# Patient Record
Sex: FEMALE
[De-identification: names, ages, dates, MRNs, and addresses within clinical notes are randomized per-mention and may not be internally consistent; named-entity substitution may affect disease eponyms.]

---

## 2019-10-07 ENCOUNTER — APPOINTMENT (OUTPATIENT)
Dept: ENDOCRINOLOGY | Facility: CLINIC | Age: 49
End: 2019-10-07
Payer: COMMERCIAL

## 2019-10-07 VITALS
BODY MASS INDEX: 33.04 KG/M2 | SYSTOLIC BLOOD PRESSURE: 140 MMHG | WEIGHT: 175 LBS | HEART RATE: 82 BPM | HEIGHT: 61 IN | DIASTOLIC BLOOD PRESSURE: 80 MMHG

## 2019-10-07 DIAGNOSIS — Z78.9 OTHER SPECIFIED HEALTH STATUS: ICD-10-CM

## 2019-10-07 PROBLEM — Z00.00 ENCOUNTER FOR PREVENTIVE HEALTH EXAMINATION: Status: ACTIVE | Noted: 2019-10-07

## 2019-10-07 PROCEDURE — 99205 OFFICE O/P NEW HI 60 MIN: CPT

## 2019-10-07 NOTE — ASSESSMENT
[Long Term Vascular Complications] : long term vascular complications of diabetes [FreeTextEntry1] : 1) DM2: controlled,. target of <7%. Natural hx of the disease and importance of treatment targets discussed at length, she verbalized understanding. ADA diet and importance of exercise discussed at length. Plan is to increase metformin to full dose and introduce GLP-1 agonist in the future.. Refer to Nutritionist today. We toney check microalbumin, lipids and labs on the NV. Discuss vaccines and podiatry/opthalmology referrals on NV \par 2) Weight gain:  complicated by DM2. Discussed medical  strategies. Pt would like to try lifestyle modifications and GLP-1 agonist therapy in the future. Reassess on the NV for at least ~5% TBW loss.\par \par 3) Essential HTN: Pt is at goal BP and on an ACE inh. Reassess microalbumin prior to the NV.\par  \par 4) Dyslipidemia: Pt not on a moderate intensity statin. Atorvastatin 20 mg QDaily. REassess lipids on the next visit. LDL target <100.\par  [Carbohydrate Consistent Diet] : carbohydrate consistent diet [Importance of Diet and Exercise] : importance of diet and exercise to improve glycemic control, achieve weight loss and improve cardiovascular health

## 2019-10-07 NOTE — PHYSICAL EXAM
[Alert] : alert [No Acute Distress] : no acute distress [Well Nourished] : well nourished [Normal Sclera/Conjunctiva] : normal sclera/conjunctiva [Well Developed] : well developed [EOMI] : extra ocular movement intact [No Proptosis] : no proptosis [Normal Oropharynx] : the oropharynx was normal [Thyroid Not Enlarged] : the thyroid was not enlarged [No Thyroid Nodules] : there were no palpable thyroid nodules [Clear to Auscultation] : lungs were clear to auscultation bilaterally [No Accessory Muscle Use] : no accessory muscle use [No Respiratory Distress] : no respiratory distress [Normal Rate] : heart rate was normal  [Normal S1, S2] : normal S1 and S2 [Regular Rhythm] : with a regular rhythm [Normal Bowel Sounds] : normal bowel sounds [No Edema] : there was no peripheral edema [Pedal Pulses Normal] : the pedal pulses are present [Not Tender] : non-tender [Soft] : abdomen soft [Not Distended] : not distended [Post Cervical Nodes] : posterior cervical nodes [Normal] : normal and non tender [Axillary Nodes] : axillary nodes [Anterior Cervical Nodes] : anterior cervical nodes [Spine Straight] : spine straight [No Spinal Tenderness] : no spinal tenderness [No Stigmata of Cushings Syndrome] : no stigmata of cushings syndrome [Normal Gait] : normal gait [No Rash] : no rash [Normal Strength/Tone] : muscle strength and tone were normal [Normal Reflexes] : deep tendon reflexes were 2+ and symmetric [Acanthosis Nigricans] : no acanthosis nigricans [No Tremors] : no tremors [Oriented x3] : oriented to person, place, and time

## 2019-10-07 NOTE — HISTORY OF PRESENT ILLNESS
[FreeTextEntry1] : 50 y/o F /w. Hx of DM2, HTN, HLD and obesity.\par here for initial evaluation and management of metabolic issues\par generally feels well and endorses no acute complaints.\par reports new diagnosis of DM2, in 6/2019. Starrted on Metformin 500 mg BID which she tolerates well. is adhereing to lifestyle changes, and loosing weight.\par She otherwise denies any f/c, CP, SOB, palpitations, tremors, depressed mood, anxiety, palpitations, n/v, stool/urinary abn, skin/weight changes, heat/cold intolerance, HAs, breast/nipple changes, polyuria/polydipsia/nocturia or other complaints.\par \par

## 2020-02-03 ENCOUNTER — APPOINTMENT (OUTPATIENT)
Dept: ENDOCRINOLOGY | Facility: CLINIC | Age: 50
End: 2020-02-03

## 2020-02-03 VITALS
HEIGHT: 61 IN | DIASTOLIC BLOOD PRESSURE: 80 MMHG | WEIGHT: 170 LBS | HEART RATE: 66 BPM | SYSTOLIC BLOOD PRESSURE: 138 MMHG | BODY MASS INDEX: 32.1 KG/M2

## 2020-02-03 NOTE — ASSESSMENT
[FreeTextEntry1] : 1) DM2: controlled,. target of <7%. Natural hx of the disease and importance of treatment targets discussed at length, she verbalized understanding. ADA diet and importance of exercise discussed at length. Plan is to increase metformin to full dose and introduce GLP-1 agonist in the future.. Refer to Nutritionist today. We toney check microalbumin, lipids and labs on the NV. Discuss vaccines and podiatry/opthalmology referrals on NV \par 2) Weight gain:  complicated by DM2. Discussed medical  strategies. Pt would like to try lifestyle modifications and GLP-1 agonist therapy in the future. Reassess on the NV for at least ~5% TBW loss.\par \par 3) Essential HTN: Pt is at goal BP and on an ACE inh. Reassess microalbumin prior to the NV.\par  \par 4) Dyslipidemia: Pt not on a moderate intensity statin. Atorvastatin 20 mg QDaily. REassess lipids on the next visit. LDL target <100.\par  [Long Term Vascular Complications] : long term vascular complications of diabetes [Carbohydrate Consistent Diet] : carbohydrate consistent diet [Importance of Diet and Exercise] : importance of diet and exercise to improve glycemic control, achieve weight loss and improve cardiovascular health

## 2020-02-03 NOTE — PHYSICAL EXAM
[Alert] : alert [No Acute Distress] : no acute distress [Well Nourished] : well nourished [Well Developed] : well developed [Normal Sclera/Conjunctiva] : normal sclera/conjunctiva [EOMI] : extra ocular movement intact [No Proptosis] : no proptosis [Normal Oropharynx] : the oropharynx was normal [Thyroid Not Enlarged] : the thyroid was not enlarged [No Thyroid Nodules] : there were no palpable thyroid nodules [No Respiratory Distress] : no respiratory distress [Clear to Auscultation] : lungs were clear to auscultation bilaterally [No Accessory Muscle Use] : no accessory muscle use [Normal Rate] : heart rate was normal  [Normal S1, S2] : normal S1 and S2 [Regular Rhythm] : with a regular rhythm [Pedal Pulses Normal] : the pedal pulses are present [No Edema] : there was no peripheral edema [Normal Bowel Sounds] : normal bowel sounds [Not Tender] : non-tender [Soft] : abdomen soft [Not Distended] : not distended [Post Cervical Nodes] : posterior cervical nodes [Anterior Cervical Nodes] : anterior cervical nodes [Axillary Nodes] : axillary nodes [Normal] : normal and non tender [No Spinal Tenderness] : no spinal tenderness [Spine Straight] : spine straight [No Stigmata of Cushings Syndrome] : no stigmata of cushings syndrome [Normal Gait] : normal gait [Normal Strength/Tone] : muscle strength and tone were normal [No Rash] : no rash [Acanthosis Nigricans] : no acanthosis nigricans [Normal Reflexes] : deep tendon reflexes were 2+ and symmetric [No Tremors] : no tremors [Oriented x3] : oriented to person, place, and time

## 2020-08-10 ENCOUNTER — APPOINTMENT (OUTPATIENT)
Dept: ENDOCRINOLOGY | Facility: CLINIC | Age: 50
End: 2020-08-10
Payer: SELF-PAY

## 2020-08-10 VITALS
BODY MASS INDEX: 34.55 KG/M2 | DIASTOLIC BLOOD PRESSURE: 90 MMHG | WEIGHT: 183 LBS | HEART RATE: 83 BPM | SYSTOLIC BLOOD PRESSURE: 140 MMHG | HEIGHT: 61 IN

## 2020-08-10 PROCEDURE — 99215 OFFICE O/P EST HI 40 MIN: CPT

## 2020-08-10 NOTE — HISTORY OF PRESENT ILLNESS
[FreeTextEntry1] : 48 y/o F /w. Hx of DM2, HTN, HLD and obesity.\par here for initial evaluation and management of metabolic issues\par generally feels well and endorses no acute complaints.\par reports new diagnosis of DM2, in 6/2019. Starrted on Metformin 500 mg BID which she tolerates well. is adhereing to lifestyle changes, and loosing weight.\par \par \par 8/2020 Here for /fu, generally feels well and endorses no acute complaints. No interval events since LV. Today reports worsening weight gain and FSG control at home, post prandial log shows hyperglycemia ~180.\par She otherwise denies any f/c, CP, SOB, palpitations, tremors, depressed mood, anxiety, palpitations, n/v, stool/urinary abn, skin/weight changes, heat/cold intolerance, HAs, breast/nipple changes, polyuria/polydipsia/nocturia or other complaints.\par \par 
no

## 2020-08-10 NOTE — PHYSICAL EXAM
[Well Nourished] : well nourished [Alert] : alert [Well Developed] : well developed [No Acute Distress] : no acute distress [Normal Sclera/Conjunctiva] : normal sclera/conjunctiva [EOMI] : extra ocular movement intact [Normal Oropharynx] : the oropharynx was normal [No Proptosis] : no proptosis [Thyroid Not Enlarged] : the thyroid was not enlarged [No Thyroid Nodules] : no palpable thyroid nodules [No Respiratory Distress] : no respiratory distress [No Accessory Muscle Use] : no accessory muscle use [Normal S1, S2] : normal S1 and S2 [Clear to Auscultation] : lungs were clear to auscultation bilaterally [Normal Rate] : heart rate was normal [Regular Rhythm] : with a regular rhythm [No Edema] : no peripheral edema [Not Tender] : non-tender [Pedal Pulses Normal] : the pedal pulses are present [Normal Bowel Sounds] : normal bowel sounds [Not Distended] : not distended [Soft] : abdomen soft [Normal Anterior Cervical Nodes] : no anterior cervical lymphadenopathy [Normal Posterior Cervical Nodes] : no posterior cervical lymphadenopathy [No Spinal Tenderness] : no spinal tenderness [Spine Straight] : spine straight [No Stigmata of Cushings Syndrome] : no stigmata of Cushings Syndrome [Normal Gait] : normal gait [Normal Strength/Tone] : muscle strength and tone were normal [No Rash] : no rash [Acanthosis Nigricans] : no acanthosis nigricans [No Tremors] : no tremors [Normal Reflexes] : deep tendon reflexes were 2+ and symmetric [Oriented x3] : oriented to person, place, and time

## 2020-08-10 NOTE — ASSESSMENT
[FreeTextEntry1] : 1) DM2: uncontrolled per FSG log. target of <7%. Natural hx of the disease and importance of treatment targets discussed at length, she verbalized understanding. ADA diet and importance of exercise discussed at length. Plan is to increase metformin to full dose and introduce GLP-1 agonist in the future.. Refer to Nutritionist today. We toney check microalbumin, lipids and labs on the NV. Discuss vaccines and podiatry/opthalmology referrals on NV \par start SGLT 2 inh, r/b/a and s.e including polyuria, nolvia and UTI reviewed. Verbalized understanding and agrees with treatment plan, will contact MD and seek emergency medical care if condition changes.\par \par \par 2) Weight gain:  complicated by DM2. Discussed medical  strategies. Pt would like to try lifestyle modifications and GLP-1 agonist therapy in the future. Reassess on the NV for at least ~5% TBW loss.\par \par 3) Essential HTN: Pt is at goal BP and on an ACE inh. Reassess microalbumin prior to the NV.\par  \par 4) Dyslipidemia: Pt not on a moderate intensity statin. Atorvastatin 20 mg QDaily. REassess lipids on the next visit. LDL target <100.\par  [Carbohydrate Consistent Diet] : carbohydrate consistent diet [Long Term Vascular Complications] : long term vascular complications of diabetes [Importance of Diet and Exercise] : importance of diet and exercise to improve glycemic control, achieve weight loss and improve cardiovascular health

## 2020-10-19 ENCOUNTER — APPOINTMENT (OUTPATIENT)
Dept: ENDOCRINOLOGY | Facility: CLINIC | Age: 50
End: 2020-10-19
Payer: COMMERCIAL

## 2020-10-19 VITALS
BODY MASS INDEX: 34.17 KG/M2 | DIASTOLIC BLOOD PRESSURE: 100 MMHG | SYSTOLIC BLOOD PRESSURE: 160 MMHG | HEART RATE: 90 BPM | WEIGHT: 181 LBS | HEIGHT: 61 IN

## 2020-10-19 PROCEDURE — 99215 OFFICE O/P EST HI 40 MIN: CPT | Mod: 25

## 2020-10-19 PROCEDURE — 99072 ADDL SUPL MATRL&STAF TM PHE: CPT

## 2020-10-19 NOTE — HISTORY OF PRESENT ILLNESS
[FreeTextEntry1] : 50 y/o F /w. Hx of DM2, HTN, HLD and obesity.\par here for initial evaluation and management of metabolic issues\par generally feels well and endorses no acute complaints.\par reports new diagnosis of DM2, in 6/2019. Starrted on Metformin 500 mg BID which she tolerates well. is adhereing to lifestyle changes, and loosing weight.\par \par \par 10/2020 Here for /fu, generally feels well and endorses no acute complaints. No interval events since LV. Today reports improcing weight and FSG control at home, post prandial log shows hyperglycemia ~160.\par She otherwise denies any f/c, CP, SOB, palpitations, tremors, depressed mood, anxiety, palpitations, n/v, stool/urinary abn, skin/weight changes, heat/cold intolerance, HAs, breast/nipple changes, polyuria/polydipsia/nocturia or other complaints.\par \par

## 2020-10-19 NOTE — ASSESSMENT
[FreeTextEntry1] : 1) DM2: uncontrolled per FSG log. target of <7%. Natural hx of the disease and importance of treatment targets discussed at length, she verbalized understanding. ADA diet and importance of exercise discussed at length. Plan is to increase metformin to full dose and introduce GLP-1 agonist in the future.. Refer to Nutritionist today. We toney check microalbumin, lipids and labs on the NV. Discuss vaccines and podiatry/opthalmology referrals on NV \par start SGLT 2 inh, r/b/a and s.e including polyuria, nolvia and UTI reviewed. Verbalized understanding and agrees with treatment plan, will contact MD and seek emergency medical care if condition changes.\par \par \par 2) Weight gain:  complicated by DM2. Discussed medical  strategies. Pt would like to try lifestyle modifications and GLP-1 agonist therapy in the future. Reassess on the NV for at least ~5% TBW loss.\par \par 3) Essential HTN: Pt is at goal BP and on an ACE inh. Reassess microalbumin prior to the NV.\par  \par 4) Dyslipidemia: Pt not on a moderate intensity statin. Atorvastatin 20 mg QDaily. REassess lipids on the next visit. LDL target <100.\par  [Long Term Vascular Complications] : long term vascular complications of diabetes [Carbohydrate Consistent Diet] : carbohydrate consistent diet [Importance of Diet and Exercise] : importance of diet and exercise to improve glycemic control, achieve weight loss and improve cardiovascular health

## 2020-10-19 NOTE — PHYSICAL EXAM
[Alert] : alert [Well Nourished] : well nourished [No Acute Distress] : no acute distress [Normal Sclera/Conjunctiva] : normal sclera/conjunctiva [Well Developed] : well developed [EOMI] : extra ocular movement intact [No Proptosis] : no proptosis [Normal Oropharynx] : the oropharynx was normal [Thyroid Not Enlarged] : the thyroid was not enlarged [No Respiratory Distress] : no respiratory distress [No Thyroid Nodules] : no palpable thyroid nodules [No Accessory Muscle Use] : no accessory muscle use [Clear to Auscultation] : lungs were clear to auscultation bilaterally [Normal S1, S2] : normal S1 and S2 [Normal Rate] : heart rate was normal [No Edema] : no peripheral edema [Regular Rhythm] : with a regular rhythm [Pedal Pulses Normal] : the pedal pulses are present [Normal Bowel Sounds] : normal bowel sounds [Not Tender] : non-tender [Not Distended] : not distended [Soft] : abdomen soft [Normal Anterior Cervical Nodes] : no anterior cervical lymphadenopathy [No Stigmata of Cushings Syndrome] : no stigmata of Cushings Syndrome [Spine Straight] : spine straight [No Spinal Tenderness] : no spinal tenderness [Normal Gait] : normal gait [Normal Strength/Tone] : muscle strength and tone were normal [No Rash] : no rash [Acanthosis Nigricans] : no acanthosis nigricans [No Tremors] : no tremors [Normal Reflexes] : deep tendon reflexes were 2+ and symmetric [Oriented x3] : oriented to person, place, and time

## 2021-01-04 ENCOUNTER — APPOINTMENT (OUTPATIENT)
Dept: ENDOCRINOLOGY | Facility: CLINIC | Age: 51
End: 2021-01-04
Payer: COMMERCIAL

## 2021-01-04 VITALS
DIASTOLIC BLOOD PRESSURE: 90 MMHG | SYSTOLIC BLOOD PRESSURE: 150 MMHG | WEIGHT: 180 LBS | HEIGHT: 61 IN | BODY MASS INDEX: 33.99 KG/M2 | HEART RATE: 91 BPM

## 2021-01-04 PROCEDURE — 99072 ADDL SUPL MATRL&STAF TM PHE: CPT

## 2021-01-04 PROCEDURE — 99214 OFFICE O/P EST MOD 30 MIN: CPT

## 2021-01-04 RX ORDER — NAPROXEN 500 MG/1
500 TABLET ORAL
Qty: 60 | Refills: 0 | Status: DISCONTINUED | COMMUNITY
Start: 2020-12-03

## 2021-01-04 RX ORDER — METFORMIN HYDROCHLORIDE 500 MG/1
500 TABLET, COATED ORAL
Qty: 60 | Refills: 0 | Status: DISCONTINUED | COMMUNITY
Start: 2020-09-11

## 2021-01-04 NOTE — ASSESSMENT
[Carbohydrate Consistent Diet] : carbohydrate consistent diet [Long Term Vascular Complications] : long term vascular complications of diabetes [Importance of Diet and Exercise] : importance of diet and exercise to improve glycemic control, achieve weight loss and improve cardiovascular health [FreeTextEntry1] : 1) DM2: uncontrolled per FSG log. target of <7%. Natural hx of the disease and importance of treatment targets discussed at length, she verbalized understanding. ADA diet and importance of exercise discussed at length. Plan is to increase metformin to full dose and introduce GLP-1 agonist in the future.. Refer to Nutritionist today. We toney check microalbumin, lipids and labs on the NV. Discuss vaccines and podiatry/opthalmology referrals on NV \par start SGLT 2 inh, r/b/a and s.e including polyuria, nolvia and UTI reviewed. Verbalized understanding and agrees with treatment plan, will contact MD and seek emergency medical care if condition changes.\par \par \par 2) Weight gain:  complicated by DM2. Discussed medical  strategies. Pt would like to try lifestyle modifications and GLP-1 agonist therapy in the future. Reassess on the NV for at least ~5% TBW loss.\par \par 3) Essential HTN: Pt is at goal BP and on an ACE inh. Reassess microalbumin prior to the NV.\par  \par 4) Dyslipidemia: Pt not on a moderate intensity statin. Atorvastatin 20 mg QDaily. REassess lipids on the next visit. LDL target <100.\par

## 2021-01-04 NOTE — HISTORY OF PRESENT ILLNESS
[FreeTextEntry1] : 49 y/o F /w. Hx of DM2, HTN, HLD and obesity.\par here for initial evaluation and management of metabolic issues\par generally feels well and endorses no acute complaints.\par reports new diagnosis of DM2, in 6/2019. Starrted on Metformin 500 mg BID which she tolerates well. is adhereing to lifestyle changes, and loosing weight.\par \par \par 1/2021 Here for /fu, generally feels well and endorses no acute complaints. No interval events since LV. Today reports improcing weight and FSG control at home, post prandial log shows hyperglycemia ~160.\par She otherwise denies any f/c, CP, SOB, palpitations, tremors, depressed mood, anxiety, palpitations, n/v, stool/urinary abn, skin/weight changes, heat/cold intolerance, HAs, breast/nipple changes, polyuria/polydipsia/nocturia or other complaints.\par \par

## 2021-04-05 ENCOUNTER — APPOINTMENT (OUTPATIENT)
Dept: ENDOCRINOLOGY | Facility: CLINIC | Age: 51
End: 2021-04-05
Payer: COMMERCIAL

## 2021-04-05 VITALS
BODY MASS INDEX: 33.04 KG/M2 | HEART RATE: 78 BPM | HEIGHT: 61 IN | DIASTOLIC BLOOD PRESSURE: 90 MMHG | WEIGHT: 175 LBS | SYSTOLIC BLOOD PRESSURE: 140 MMHG

## 2021-04-05 PROCEDURE — 99072 ADDL SUPL MATRL&STAF TM PHE: CPT

## 2021-04-05 PROCEDURE — 99214 OFFICE O/P EST MOD 30 MIN: CPT

## 2021-04-05 NOTE — ASSESSMENT
[FreeTextEntry1] : 1) DM2: now controlled per FSG log and A1c of 6.5 on 4/2021. target of <7%. Natural hx of the disease and importance of treatment targets discussed at length, she verbalized understanding. ADA diet and importance of exercise discussed at length. Plan is to increase metformin to full dose and introduce GLP-1 agonist in the future.. Refer to Nutritionist today. We toney check microalbumin, lipids and labs on the NV. Discuss vaccines and podiatry/opthalmology referrals on NV \par start SGLT 2 inh, r/b/a and s.e including polyuria, nolvia and UTI reviewed. Verbalized understanding and agrees with treatment plan, will contact MD and seek emergency medical care if condition changes.\par \par \par 2) Weight gain:  complicated by DM2. Discussed medical  strategies. Pt would like to try lifestyle modifications and GLP-1 agonist therapy in the future. Reassess on the NV for at least ~5% TBW loss.\par \par 3) Essential HTN: Pt is at goal BP and on an ACE inh. Reassess microalbumin prior to the NV.\par  \par 4) Dyslipidemia: Pt not on a moderate intensity statin. Atorvastatin 20 mg QDaily. REassess lipids on the next visit. LDL target <100.\par  [Carbohydrate Consistent Diet] : carbohydrate consistent diet [Long Term Vascular Complications] : long term vascular complications of diabetes [Importance of Diet and Exercise] : importance of diet and exercise to improve glycemic control, achieve weight loss and improve cardiovascular health

## 2021-04-05 NOTE — HISTORY OF PRESENT ILLNESS
[FreeTextEntry1] : 51 y/o F /w. Hx of DM2, HTN, HLD and obesity.\par here for initial evaluation and management of metabolic issues\par generally feels well and endorses no acute complaints.\par reports new diagnosis of DM2, in 6/2019. Starrted on Metformin 500 mg BID which she tolerates well. is adhereing to lifestyle changes, and loosing weight.\par \par \par 4/2021 Here for /fu, generally feels well and endorses no acute complaints. No interval events since LV. Today reports improving weight and FSG control at home, post prandial log shows hyperglycemia ~160.\par She otherwise denies any f/c, CP, SOB, palpitations, tremors, depressed mood, anxiety, palpitations, n/v, stool/urinary abn, skin/weight changes, heat/cold intolerance, HAs, breast/nipple changes, polyuria/polydipsia/nocturia or other complaints.\par \par

## 2021-08-02 ENCOUNTER — APPOINTMENT (OUTPATIENT)
Dept: ENDOCRINOLOGY | Facility: CLINIC | Age: 51
End: 2021-08-02

## 2021-09-24 NOTE — END OF VISIT
Referred by: Angela Joiner MD; Medical Diagnosis (from order):    Diagnosis Information      Diagnosis    729.89 (ICD-9-CM) - R29.898 (ICD-10-CM) - Bilateral arm weakness    V49.89 (ICD-9-CM) - Z78.9 (ICD-10-CM) - Deficit in activities of daily living (ADL)                Initial Evaluation    Visit:  Visit count could not be calculated. Make sure you are using a visit which is associated with an episode. Visit: 1  Treatment Diagnosis: left: upper extremity, right: upper extremity symptoms with increased pain/symptoms, impaired strength, impaired activity tolerance  Date of onset: October, 2020  Diagnosis Precautions: Falls  Patient alert and oriented X3.  Chart reviewed at time of initial evaluation (relevant co-morbidities, allergies, tests and medications listed):   PTSD (post-traumatic stress disorder)                         Bipolar disorder (CMS/HCC)                                    Schizophrenia (CMS/HCC)                                       Sinus bradycardia                                             Suicide attempt by drug ingestion (CMS/HCC)                   Pre-diabetes                                                  Chicken pox                                                   GERD (gastroesophageal reflux disease)                        Anxiety                                                       Left knee pain                                                  Comment: chronic, 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    Essential (primary) hypertension                              COPD (chronic obstructive pulmonary disease) (*               Arthritis                                                     Fracture                                                        Comment: right arm, left toes    Diabetes mellitus (CMS/HCC)                                   Anemia                                                        Depression                                                    SUBJECTIVE                                                                                                                \"Last year I started to have problems. They told me I had diabetes. My arms and legs are weak. I have tingling in my fingers. Not able to use my hands very well. My daughter has to help me take a shower, get dressed and cook meals\".  Pain / Symptoms:  Pain/symptom is: intermittent  Pain rating (out of 10): Current: 7 ; Best: 7; Worst: 9  Location: \"Hands and feet are the worst\".  Quality / Description: ache, tingling, pins and needles, numbness, tight, shooting.  Alleviating Factors: avoiding movement in involved area.   Progression since onset: worsening  Function:   Limitations / Exacerbation Factors: pain, difficulty, increased time, upper body dressing, grooming/hygiene/self-care activities, sleep disturbed, meal/food prep, lower body dressing, grocery shopping, writing, house/yard work, opening doors, fine motor tasks, pushing/pulling, lifting/carrying and reaching  Prior Level of Function: declining function, therefore referred to therapy,  Personal Occupations Profile Affected: bathing/showering, feeding, upper body dressing, lower body dressing, personal hygiene/grooming, sleep participation, home establishment/managements, meal preparation/cleanup, shopping  Patient Goals: decreased pain, increased motion, increased strength and independence with ADLs/IADLs    Prior treatment: no therapies  Discharged from hospital, home health, or skilled nursing facility in last 30 days: no    Home Environment:   Patient lives with children  Assistance available: as needed  Feels safe at home/work/school.  2 or more falls or an unexplained fall with injury in the last year: Yes. patient currently being seen in PT    OBJECTIVE                                                                                                                     Range of Motion (ROM)   (degrees unless noted; active unless noted; norms in ( );  negative=lacking to 0, positive=beyond 0)   WFL: left shoulder, right shoulder, left elbow, right elbow, left wrist, right wrist, left hand/fingers, right hand/fingers    Strength  (out of 5 unless noted, standard test position unless noted, lbs tested with hand held dynamometer)   Shoulder:     - Flexion:         • Left: 4-         • Right: 3+     - Extension:           • Left: 4        • Right: 4    - Abduction:        • Left: 4-        • Right: 3+    - Internal Rotation:          • Left (at 45°): 4-        • Right (at 45°): 3+    - External Rotation:         • Left (at 45°): 4-        • Right (at 45°): 3+  Elbow/Forearm:    - Flexion:        • Left: 4        • Right: 4-     - Extension:        • Left: 4        • Right: 4-     - Supination:        • Left: 4-        • Right: 3+  Wrist:    - Flexion:        • Left: 4        • Right: 4-    - Extension:        • Left: 4-        • Right: 4-  : (lbs)    - Neutral:        • Left: Trial(s): 31, 32        • Right: Trial(s): 29, 28  Pinch: (lbs)    - Lateral:         • Left: Trial(s): 6, 7.5        • Right: Trial(s): 6, 6       TREATMENT                                                                                                                initial evaluation completed    Therapeutic Exercise:  TheraSponge: light-medium resistance (yellow + pink), bilateral , lateral pinch 3 sets of 10 repetitions each to increase hand strength.*    Theraband row with Level 1 band; 3 x 10 reps with 3 second hold; to improve shoulder strength.*    Skilled input: verbal instruction/cues and tactile instruction/cues    Writer verbally educated and received verbal consent for hand placement, positioning of patient, and techniques to be performed today from patient for clothing adjustments for techniques, hand placement and palpation for techniques and therapist position for techniques as described above and how they are pertinent to the patient's plan of care.    Home Exercise  Program/Education Materials: *above indicates provided as part of home exercise program     ASSESSMENT                                                                                                           45 year old female patient has reported functional limitations listed above impacted by signs and symptoms consistent with below   • Involved:       - left upper extremity      - right upper extremity   • Symptoms/impairments: increased pain/symptoms, impaired strength and impaired activity tolerance  Tomorrow Francisco presents with signs and symptoms consistent with bilateral UE weakness which limits AROM, strength, endurance and ADL independence.    Pain/symptoms after session (out of 10): 3    Prognosis: patient will benefit from skilled therapy  Rehabilitative potential is: fair due to; positive factors: age and motivation level; negative factors: increasing symptoms since onset and time since onset of symptoms  Clinical decision making: Low - Patient has few limitations (1-3), comorbidities and/or complexities, as noted in problem focused assessment noted above, that impact their occupational profile.  Resulting in few treatment options and no task modification consistent with low clinical decision making complexity.    Patient Education:   Who will be receiving education: patient  Are they ready to learn: yes  Preferred learning style: demonstration, verbal and written  Barriers to learning: no barriers apparent at this time  Results of above outlined education: Verbalizes understanding and Demonstrates understanding    Therapist performed and billed unit(s) of today's treatment. and This note sent for referring provider signature        PLAN                                                                                                                         The following skilled interventions to be implemented to achieve goals listed below:  Activities of Daily Living/Self Care (20777)  Manual Therapy  (58390)  Neuromuscular Re-Education (14201)  Therapeutic Activity (11642)  Therapeutic Exercise (31377)  Electrical Stimulation (unattended, 46696 or )  Electrical Stimulation (attended, 62430)  Fluidotherapy/Whirlpool (61400)  Heat/Cold (29769)  Ultrasound/Phonophoresis (26575)  Paraffin (02344)  Splinting/Orthotics    Frequency / Duration: 2 times per week tapering as patient progresses for an estimated total of 16 visits for 12 weeks    Patient involved in and agreed to plan of care and goals.  Patient given attendance policy at time of initial evaluation.  Suggestions for next session as indicated: Progress per plan of care:  Modalities  Manual therapy  PROM/AROM  Theraband UE strengthening      GOALS                                                                                                                           Long Term Goals: to be met by end of plan of care  1. Increase shoulder, elbow and wrist strength to 4+/5, all motions, to prepare meals, perform house chores and grocery shop.  2. Increase right and left  strength to 50# and lateral pinch to 15# to open food containers and wash dishes.      Therapy procedure time and total treatment time can be found documented on the Time Entry flowsheet   [Time Spent: ___ minutes] : I have spent [unfilled] minutes of time on the encounter.

## 2021-10-11 ENCOUNTER — APPOINTMENT (OUTPATIENT)
Dept: ENDOCRINOLOGY | Facility: CLINIC | Age: 51
End: 2021-10-11
Payer: MEDICAID

## 2021-10-11 VITALS
DIASTOLIC BLOOD PRESSURE: 80 MMHG | BODY MASS INDEX: 33.04 KG/M2 | SYSTOLIC BLOOD PRESSURE: 130 MMHG | HEIGHT: 61 IN | HEART RATE: 76 BPM | WEIGHT: 175 LBS

## 2021-10-11 PROCEDURE — 99072 ADDL SUPL MATRL&STAF TM PHE: CPT

## 2021-10-11 PROCEDURE — 99215 OFFICE O/P EST HI 40 MIN: CPT

## 2021-10-11 NOTE — ASSESSMENT
[FreeTextEntry1] : 1) DM2: now controlled per FSG log and A1c of 6.5 on 4/2021. target of <7%. Natural hx of the disease and importance of treatment targets discussed at length, she verbalized understanding. ADA diet and importance of exercise discussed at length. Plan is to increase metformin to full dose and introduce GLP-1 agonist in the future.. Refer to Nutritionist today. We toney check microalbumin, lipids and labs on the NV. Discuss vaccines and podiatry/opthalmology referrals on NV \par increase SGLT 2 inh, r/b/a and s.e including polyuria, nolvia and UTI reviewed. Verbalized understanding and agrees with treatment plan, will contact MD and seek emergency medical care if condition changes.\par \par \par 2) Weight gain:  complicated by DM2. Discussed medical  strategies. Pt would like to try lifestyle modifications and GLP-1 agonist therapy in the future. Reassess on the NV for at least ~5% TBW loss.\par \par 3) Essential HTN: Pt is at goal BP and on an ACE inh. Reassess microalbumin prior to the NV.\par  \par 4) Dyslipidemia: Pt not on a moderate intensity statin. Atorvastatin 20 mg QDaily. REassess lipids on the next visit. LDL target <100.\par  [Carbohydrate Consistent Diet] : carbohydrate consistent diet [Long Term Vascular Complications] : long term vascular complications of diabetes [Importance of Diet and Exercise] : importance of diet and exercise to improve glycemic control, achieve weight loss and improve cardiovascular health

## 2021-10-11 NOTE — HISTORY OF PRESENT ILLNESS
[FreeTextEntry1] : 51 y/o F /w. Hx of DM2, HTN, HLD and obesity.\par here for initial evaluation and management of metabolic issues\par generally feels well and endorses no acute complaints.\par reports new diagnosis of DM2, in 6/2019. Starrted on Metformin 500 mg BID which she tolerates well. is adhereing to lifestyle changes, and loosing weight.\par \par \par 10/2021 Here for /fu, generally feels well and endorses no acute complaints. No interval events since LV. Today reports improving weight and FSG control at home, post prandial log shows hyperglycemia ~160.\par She otherwise denies any f/c, CP, SOB, palpitations, tremors, depressed mood, anxiety, palpitations, n/v, stool/urinary abn, skin/weight changes, heat/cold intolerance, HAs, breast/nipple changes, polyuria/polydipsia/nocturia or other complaints.\par \par

## 2022-01-11 ENCOUNTER — RX RENEWAL (OUTPATIENT)
Age: 52
End: 2022-01-11

## 2022-01-24 ENCOUNTER — APPOINTMENT (OUTPATIENT)
Dept: ENDOCRINOLOGY | Facility: CLINIC | Age: 52
End: 2022-01-24
Payer: MEDICAID

## 2022-01-24 VITALS
HEART RATE: 77 BPM | DIASTOLIC BLOOD PRESSURE: 90 MMHG | BODY MASS INDEX: 33.79 KG/M2 | WEIGHT: 179 LBS | HEIGHT: 61 IN | SYSTOLIC BLOOD PRESSURE: 130 MMHG

## 2022-01-24 PROCEDURE — 99072 ADDL SUPL MATRL&STAF TM PHE: CPT

## 2022-01-24 PROCEDURE — 99215 OFFICE O/P EST HI 40 MIN: CPT

## 2022-01-24 RX ORDER — EMPAGLIFLOZIN 25 MG/1
25 TABLET, FILM COATED ORAL
Qty: 90 | Refills: 1 | Status: COMPLETED | COMMUNITY
Start: 2020-08-10 | End: 2022-01-24

## 2022-01-24 NOTE — HISTORY OF PRESENT ILLNESS
[FreeTextEntry1] : 52 y/o F /w. Hx of DM2, HTN, HLD and obesity.\par here for initial evaluation and management of metabolic issues\par generally feels well and endorses no acute complaints.\par reports new diagnosis of DM2, in 6/2019. Starrted on Metformin 500 mg BID which she tolerates well. is adhereing to lifestyle changes, and loosing weight.\par \par \par 1/2022 Here for /fu, generally feels well and endorses no acute complaints. No interval events since LV. Today reports improving weight and FSG control at home, post prandial log shows hyperglycemia ~160.\par She otherwise denies any f/c, CP, SOB, palpitations, tremors, depressed mood, anxiety, palpitations, n/v, stool/urinary abn, skin/weight changes, heat/cold intolerance, HAs, breast/nipple changes, polyuria/polydipsia/nocturia or other complaints.\par \par

## 2022-04-12 ENCOUNTER — RX RENEWAL (OUTPATIENT)
Age: 52
End: 2022-04-12

## 2022-05-02 ENCOUNTER — APPOINTMENT (OUTPATIENT)
Dept: ENDOCRINOLOGY | Facility: CLINIC | Age: 52
End: 2022-05-02
Payer: MEDICAID

## 2022-05-02 VITALS
WEIGHT: 179 LBS | BODY MASS INDEX: 33.79 KG/M2 | SYSTOLIC BLOOD PRESSURE: 120 MMHG | DIASTOLIC BLOOD PRESSURE: 80 MMHG | HEART RATE: 78 BPM | HEIGHT: 61 IN

## 2022-05-02 DIAGNOSIS — E55.9 VITAMIN D DEFICIENCY, UNSPECIFIED: ICD-10-CM

## 2022-05-02 PROCEDURE — 99215 OFFICE O/P EST HI 40 MIN: CPT

## 2022-05-02 NOTE — ASSESSMENT
[FreeTextEntry1] : 1) DM2: now controlled per FSG log and A1c of 6.5 on 4/2021. target of <7%. Natural hx of the disease and importance of treatment targets discussed at length, she verbalized understanding. ADA diet and importance of exercise discussed at length. Plan is to increase metformin to full dose and introduce GLP-1 agonist in the future.. Refer to Nutritionist today. We toney check microalbumin, lipids and labs on the NV. Discuss vaccines and podiatry/opthalmology referrals on NV \par increase SGLT 2 inh, r/b/a and s.e including polyuria, nolvia and UTI reviewed. Verbalized understanding and agrees with treatment plan, will contact MD and seek emergency medical care if condition changes.\par \par \par 2) Weight gain:  complicated by DM2. Discussed medical  strategies. Pt would like to try lifestyle modifications and GLP-1 agonist therapy in the future. Reassess on the NV for at least ~5% TBW loss.\par \par 3) Essential HTN: Pt is at goal BP and on an ACE inh. Reassess microalbumin prior to the NV.\par  \par 4) Dyslipidemia: Pt on a moderate intensity statin. Atorvastatin 20 mg QDaily. REassess lipids on the next visit. LDL target <100.\par  [Carbohydrate Consistent Diet] : carbohydrate consistent diet [Long Term Vascular Complications] : long term vascular complications of diabetes [Importance of Diet and Exercise] : importance of diet and exercise to improve glycemic control, achieve weight loss and improve cardiovascular health

## 2022-05-02 NOTE — HISTORY OF PRESENT ILLNESS
[FreeTextEntry1] : 52 y/o F /w. Hx of DM2, HTN, HLD and obesity.\par here for initial evaluation and management of metabolic issues\par generally feels well and endorses no acute complaints.\par reports new diagnosis of DM2, in 6/2019. Starrted on Metformin 500 mg BID which she tolerates well. is adhereing to lifestyle changes, and loosing weight.\par \par \par 5/2022 Here for /fu, generally feels well and endorses no acute complaints. No interval events since LV. Today reports improving weight and FSG control at home, post prandial log shows hyperglycemia ~160.\par She otherwise denies any f/c, CP, SOB, palpitations, tremors, depressed mood, anxiety, palpitations, n/v, stool/urinary abn, skin/weight changes, heat/cold intolerance, HAs, breast/nipple changes, polyuria/polydipsia/nocturia or other complaints.\par \par

## 2022-09-19 ENCOUNTER — APPOINTMENT (OUTPATIENT)
Dept: ENDOCRINOLOGY | Facility: CLINIC | Age: 52
End: 2022-09-19

## 2022-09-19 VITALS
WEIGHT: 176 LBS | HEIGHT: 64 IN | DIASTOLIC BLOOD PRESSURE: 80 MMHG | BODY MASS INDEX: 30.05 KG/M2 | HEART RATE: 78 BPM | SYSTOLIC BLOOD PRESSURE: 120 MMHG

## 2022-09-19 PROCEDURE — 99215 OFFICE O/P EST HI 40 MIN: CPT

## 2022-09-19 NOTE — HISTORY OF PRESENT ILLNESS
[FreeTextEntry1] : 51 y/o F /w. Hx of DM2, HTN, HLD and obesity.\par here for initial evaluation and management of metabolic issues\par generally feels well and endorses no acute complaints.\par reports new diagnosis of DM2, in 6/2019. Starrted on Metformin 500 mg BID which she tolerates well. is adhereing to lifestyle changes, and loosing weight.\par \par \par 9/2022 Here for /fu, generally feels well and endorses no acute complaints. No interval events since LV. Today reports improving weight and FSG control at home, post prandial log shows hyperglycemia ~160.\par She otherwise denies any f/c, CP, SOB, palpitations, tremors, depressed mood, anxiety, palpitations, n/v, stool/urinary abn, skin/weight changes, heat/cold intolerance, HAs, breast/nipple changes, polyuria/polydipsia/nocturia or other complaints.\par \par

## 2022-09-19 NOTE — ASSESSMENT
[Carbohydrate Consistent Diet] : carbohydrate consistent diet [Long Term Vascular Complications] : long term vascular complications of diabetes [Importance of Diet and Exercise] : importance of diet and exercise to improve glycemic control, achieve weight loss and improve cardiovascular health [FreeTextEntry1] : 1) DM2: now controlled per FSG log and A1c of 6.5 on 4/2021. target of <7%. Natural hx of the disease and importance of treatment targets discussed at length, she verbalized understanding. ADA diet and importance of exercise discussed at length. Plan is to increase metformin to full dose and introduce GLP-1 agonist in the future.. Refer to Nutritionist today. We toney check microalbumin, lipids and labs on the NV. Discuss vaccines and podiatry/opthalmology referrals on NV \par increase SGLT 2 inh, r/b/a and s.e including polyuria, nolvia and UTI reviewed. Verbalized understanding and agrees with treatment plan, will contact MD and seek emergency medical care if condition changes.\par \par \par 2) Weight gain:  complicated by DM2. Discussed medical  strategies. Pt would like to try lifestyle modifications and GLP-1 agonist therapy in the future. Reassess on the NV for at least ~5% TBW loss.\par \par 3) Essential HTN: Pt is at goal BP and on an ACE inh. Reassess microalbumin prior to the NV.\par  \par 4) Dyslipidemia: Pt on a moderate intensity statin. Atorvastatin 20 mg QDaily. REassess lipids on the next visit. LDL target <100.\par

## 2023-02-06 ENCOUNTER — APPOINTMENT (OUTPATIENT)
Dept: ENDOCRINOLOGY | Facility: CLINIC | Age: 53
End: 2023-02-06
Payer: MEDICAID

## 2023-02-06 VITALS
WEIGHT: 172 LBS | DIASTOLIC BLOOD PRESSURE: 80 MMHG | HEIGHT: 64 IN | SYSTOLIC BLOOD PRESSURE: 120 MMHG | BODY MASS INDEX: 29.37 KG/M2

## 2023-02-06 PROCEDURE — 99215 OFFICE O/P EST HI 40 MIN: CPT

## 2023-02-06 NOTE — HISTORY OF PRESENT ILLNESS
[FreeTextEntry1] : 53 y/o F /w. Hx of DM2, HTN, HLD and obesity.\par here for initial evaluation and management of metabolic issues\par generally feels well and endorses no acute complaints.\par reports new diagnosis of DM2, in 6/2019. Starrted on Metformin 500 mg BID which she tolerates well. is adhereing to lifestyle changes, and loosing weight.\par \par \par 2/2023 Here for /fu, generally feels well and endorses no acute complaints. No interval events since LV. Today reports improving weight and FSG control at home, post prandial log shows hyperglycemia ~160.\par She otherwise denies any f/c, CP, SOB, palpitations, tremors, depressed mood, anxiety, palpitations, n/v, stool/urinary abn, skin/weight changes, heat/cold intolerance, HAs, breast/nipple changes, polyuria/polydipsia/nocturia or other complaints.\par \par  No

## 2023-02-08 LAB — MICROALBUMIN/CREAT 24H UR-RTO: 4

## 2023-06-05 ENCOUNTER — APPOINTMENT (OUTPATIENT)
Dept: ENDOCRINOLOGY | Facility: CLINIC | Age: 53
End: 2023-06-05
Payer: MEDICAID

## 2023-06-05 VITALS
WEIGHT: 176 LBS | BODY MASS INDEX: 30.05 KG/M2 | HEIGHT: 64 IN | SYSTOLIC BLOOD PRESSURE: 120 MMHG | DIASTOLIC BLOOD PRESSURE: 70 MMHG

## 2023-06-05 PROCEDURE — 99215 OFFICE O/P EST HI 40 MIN: CPT

## 2023-06-05 NOTE — HISTORY OF PRESENT ILLNESS
[FreeTextEntry1] : 51 y/o F /w. Hx of DM2, HTN, HLD and obesity.\par here for initial evaluation and management of metabolic issues\par generally feels well and endorses no acute complaints.\par reports new diagnosis of DM2, in 6/2019. Starrted on Metformin 500 mg BID which she tolerates well. is adhereing to lifestyle changes, and loosing weight.\par \par \par 6/2023 Here for /fu, generally feels well and endorses no acute complaints. No interval events since LV. Today reports improving weight and FSG control at home, post prandial log shows hyperglycemia ~160.\par She otherwise denies any f/c, CP, SOB, palpitations, tremors, depressed mood, anxiety, palpitations, n/v, stool/urinary abn, skin/weight changes, heat/cold intolerance, HAs, breast/nipple changes, polyuria/polydipsia/nocturia or other complaints.\par \par

## 2023-12-04 ENCOUNTER — APPOINTMENT (OUTPATIENT)
Dept: ENDOCRINOLOGY | Facility: CLINIC | Age: 53
End: 2023-12-04
Payer: MEDICAID

## 2023-12-04 PROCEDURE — 99214 OFFICE O/P EST MOD 30 MIN: CPT

## 2023-12-05 LAB — MICROALBUMIN/CREAT 24H UR-RTO: 3.3

## 2024-06-03 ENCOUNTER — APPOINTMENT (OUTPATIENT)
Dept: ENDOCRINOLOGY | Facility: CLINIC | Age: 54
End: 2024-06-03
Payer: COMMERCIAL

## 2024-06-03 DIAGNOSIS — E78.5 HYPERLIPIDEMIA, UNSPECIFIED: ICD-10-CM

## 2024-06-03 DIAGNOSIS — E11.9 TYPE 2 DIABETES MELLITUS W/OUT COMPLICATIONS: ICD-10-CM

## 2024-06-03 DIAGNOSIS — E66.9 OBESITY, UNSPECIFIED: ICD-10-CM

## 2024-06-03 DIAGNOSIS — I10 ESSENTIAL (PRIMARY) HYPERTENSION: ICD-10-CM

## 2024-06-03 PROCEDURE — 99214 OFFICE O/P EST MOD 30 MIN: CPT

## 2024-06-03 RX ORDER — METFORMIN ER 500 MG 500 MG/1
500 TABLET ORAL
Qty: 360 | Refills: 2 | Status: ACTIVE | COMMUNITY
Start: 2022-01-11 | End: 1900-01-01

## 2024-06-03 RX ORDER — AMLODIPINE BESYLATE AND BENAZEPRIL HYDROCHLORIDE 5; 40 MG/1; MG/1
5-40 CAPSULE ORAL
Qty: 90 | Refills: 3 | Status: ACTIVE | COMMUNITY
Start: 2022-04-12 | End: 1900-01-01

## 2024-06-03 RX ORDER — ERGOCALCIFEROL 1.25 MG/1
1.25 MG CAPSULE ORAL
Qty: 13 | Refills: 3 | Status: ACTIVE | COMMUNITY
Start: 2019-10-07 | End: 1900-01-01

## 2024-06-03 RX ORDER — CANAGLIFLOZIN 300 MG/1
300 TABLET, FILM COATED ORAL DAILY
Qty: 90 | Refills: 3 | Status: ACTIVE | COMMUNITY
Start: 2021-10-14 | End: 1900-01-01

## 2024-06-03 RX ORDER — ATORVASTATIN CALCIUM 20 MG/1
20 TABLET, FILM COATED ORAL
Qty: 90 | Refills: 3 | Status: ACTIVE | COMMUNITY
Start: 1900-01-01 | End: 1900-01-01

## 2024-06-03 NOTE — ASSESSMENT
[FreeTextEntry1] : 1) DM2: now controlled per FSG log and A1c of 6.5 on 4/2021. target of <7%. Natural hx of the disease and importance of treatment targets discussed at length, she verbalized understanding. ADA diet and importance of exercise discussed at length. Plan is to increase metformin to full dose and introduce GLP-1 agonist in the future.. Refer to Nutritionist today. We toney check microalbumin, lipids and labs on the NV. Discuss vaccines and podiatry/opthalmology referrals on NV  increase SGLT 2 inh, r/b/a and s.e including polyuria, nolvia and UTI reviewed. Verbalized understanding and agrees with treatment plan, will contact MD and seek emergency medical care if condition changes.      2) Weight gain: complicated by DM2. Discussed medical strategies. Pt would like to try lifestyle modifications and GLP-1 agonist therapy in the future. Reassess on the NV for at least ~5% TBW loss.    3) Essential HTN: Pt is at goal BP and on an ACE inh. Reassess microalbumin prior to the NV.    4) Dyslipidemia: Pt on a moderate intensity statin. Atorvastatin 20 mg QDaily. REassess lipids on the next visit. LDL target <100. [Carbohydrate Consistent Diet] : carbohydrate consistent diet [Long Term Vascular Complications] : long term vascular complications of diabetes [Importance of Diet and Exercise] : importance of diet and exercise to improve glycemic control, achieve weight loss and improve cardiovascular health

## 2024-06-03 NOTE — HISTORY OF PRESENT ILLNESS
[FreeTextEntry1] : 54y/o F /w. Hx of DM2, HTN, HLD and obesity. here for initial evaluation and management of metabolic issues generally feels well and endorses no acute complaints. reports new diagnosis of DM2, in 6/2019. Starrted on Metformin 500 mg BID which she tolerates well. is adhereing to lifestyle changes, and loosing weight.   6/ 2024 Here for /fu, generally feels well and endorses no acute complaints. No interval events since LV. Today reports improving weight and FSG control at home, post prandial log shows hyperglycemia ~160. She otherwise denies any f/c, CP, SOB, palpitations, tremors, depressed mood, anxiety, palpitations, n/v, stool/urinary abn, skin/weight changes, heat/cold intolerance, HAs, breast/nipple changes, polyuria/polydipsia/nocturia or other complaints.

## 2024-12-09 ENCOUNTER — APPOINTMENT (OUTPATIENT)
Dept: ENDOCRINOLOGY | Facility: CLINIC | Age: 54
End: 2024-12-09
Payer: COMMERCIAL

## 2024-12-09 VITALS — HEIGHT: 64 IN | WEIGHT: 184 LBS | BODY MASS INDEX: 31.41 KG/M2

## 2024-12-09 DIAGNOSIS — E11.9 TYPE 2 DIABETES MELLITUS W/OUT COMPLICATIONS: ICD-10-CM

## 2024-12-09 DIAGNOSIS — E78.5 HYPERLIPIDEMIA, UNSPECIFIED: ICD-10-CM

## 2024-12-09 DIAGNOSIS — E66.9 OBESITY, UNSPECIFIED: ICD-10-CM

## 2024-12-09 DIAGNOSIS — I10 ESSENTIAL (PRIMARY) HYPERTENSION: ICD-10-CM

## 2024-12-09 DIAGNOSIS — E55.9 VITAMIN D DEFICIENCY, UNSPECIFIED: ICD-10-CM

## 2024-12-09 PROCEDURE — 99214 OFFICE O/P EST MOD 30 MIN: CPT

## 2024-12-09 PROCEDURE — G2211 COMPLEX E/M VISIT ADD ON: CPT | Mod: NC

## 2025-02-10 ENCOUNTER — NON-APPOINTMENT (OUTPATIENT)
Age: 55
End: 2025-02-10

## 2025-03-06 DIAGNOSIS — E11.9 TYPE 2 DIABETES MELLITUS W/OUT COMPLICATIONS: ICD-10-CM

## 2025-03-06 RX ORDER — DAPAGLIFLOZIN 10 MG/1
10 TABLET, FILM COATED ORAL
Qty: 30 | Refills: 5 | Status: ACTIVE | COMMUNITY
Start: 2025-03-06 | End: 1900-01-01

## 2025-04-07 ENCOUNTER — APPOINTMENT (OUTPATIENT)
Dept: ENDOCRINOLOGY | Facility: CLINIC | Age: 55
End: 2025-04-07
Payer: COMMERCIAL

## 2025-04-07 DIAGNOSIS — E11.9 TYPE 2 DIABETES MELLITUS W/OUT COMPLICATIONS: ICD-10-CM

## 2025-04-07 DIAGNOSIS — E66.9 OBESITY, UNSPECIFIED: ICD-10-CM

## 2025-04-07 DIAGNOSIS — I10 ESSENTIAL (PRIMARY) HYPERTENSION: ICD-10-CM

## 2025-04-07 PROCEDURE — 99214 OFFICE O/P EST MOD 30 MIN: CPT

## 2025-08-18 ENCOUNTER — APPOINTMENT (OUTPATIENT)
Dept: ENDOCRINOLOGY | Facility: CLINIC | Age: 55
End: 2025-08-18
Payer: COMMERCIAL

## 2025-08-18 DIAGNOSIS — E66.9 OBESITY, UNSPECIFIED: ICD-10-CM

## 2025-08-18 DIAGNOSIS — E78.5 HYPERLIPIDEMIA, UNSPECIFIED: ICD-10-CM

## 2025-08-18 DIAGNOSIS — I10 ESSENTIAL (PRIMARY) HYPERTENSION: ICD-10-CM

## 2025-08-18 DIAGNOSIS — E11.9 TYPE 2 DIABETES MELLITUS W/OUT COMPLICATIONS: ICD-10-CM

## 2025-08-18 PROCEDURE — 99215 OFFICE O/P EST HI 40 MIN: CPT

## 2025-08-18 RX ORDER — ORAL SEMAGLUTIDE 3 MG/1
3 TABLET ORAL DAILY
Qty: 90 | Refills: 1 | Status: ACTIVE | COMMUNITY
Start: 2025-08-18 | End: 1900-01-01